# Patient Record
Sex: FEMALE | Race: WHITE | NOT HISPANIC OR LATINO | Employment: FULL TIME | URBAN - METROPOLITAN AREA
[De-identification: names, ages, dates, MRNs, and addresses within clinical notes are randomized per-mention and may not be internally consistent; named-entity substitution may affect disease eponyms.]

---

## 2017-01-16 ENCOUNTER — OFFICE VISIT (OUTPATIENT)
Dept: PAIN MEDICINE | Facility: CLINIC | Age: 57
End: 2017-01-16
Payer: COMMERCIAL

## 2017-01-16 ENCOUNTER — LAB VISIT (OUTPATIENT)
Dept: LAB | Facility: OTHER | Age: 57
End: 2017-01-16
Attending: NURSE PRACTITIONER
Payer: COMMERCIAL

## 2017-01-16 VITALS
RESPIRATION RATE: 20 BRPM | SYSTOLIC BLOOD PRESSURE: 135 MMHG | DIASTOLIC BLOOD PRESSURE: 82 MMHG | HEART RATE: 72 BPM | HEIGHT: 63 IN | TEMPERATURE: 98 F | WEIGHT: 274.06 LBS | BODY MASS INDEX: 48.56 KG/M2

## 2017-01-16 DIAGNOSIS — M79.10 MYALGIA: ICD-10-CM

## 2017-01-16 DIAGNOSIS — M47.816 FACET ARTHROPATHY, LUMBAR: ICD-10-CM

## 2017-01-16 DIAGNOSIS — G89.4 CHRONIC PAIN DISORDER: ICD-10-CM

## 2017-01-16 DIAGNOSIS — G57.00 PIRIFORMIS SYNDROME, UNSPECIFIED LATERALITY: ICD-10-CM

## 2017-01-16 DIAGNOSIS — M47.816 FACET ARTHROPATHY, LUMBAR: Primary | ICD-10-CM

## 2017-01-16 DIAGNOSIS — M79.7 FIBROMYALGIA: ICD-10-CM

## 2017-01-16 DIAGNOSIS — M96.1 POSTLAMINECTOMY SYNDROME OF LUMBAR REGION: ICD-10-CM

## 2017-01-16 LAB
CREAT SERPL-MCNC: 0.8 MG/DL
EST. GFR  (AFRICAN AMERICAN): >60 ML/MIN/1.73 M^2
EST. GFR  (NON AFRICAN AMERICAN): >60 ML/MIN/1.73 M^2

## 2017-01-16 PROCEDURE — 99214 OFFICE O/P EST MOD 30 MIN: CPT | Mod: S$GLB,,, | Performed by: NURSE PRACTITIONER

## 2017-01-16 PROCEDURE — 82565 ASSAY OF CREATININE: CPT

## 2017-01-16 PROCEDURE — 36415 COLL VENOUS BLD VENIPUNCTURE: CPT

## 2017-01-16 PROCEDURE — 3075F SYST BP GE 130 - 139MM HG: CPT | Mod: S$GLB,,, | Performed by: NURSE PRACTITIONER

## 2017-01-16 PROCEDURE — 3079F DIAST BP 80-89 MM HG: CPT | Mod: S$GLB,,, | Performed by: NURSE PRACTITIONER

## 2017-01-16 PROCEDURE — 99999 PR PBB SHADOW E&M-EST. PATIENT-LVL IV: CPT | Mod: PBBFAC,,, | Performed by: NURSE PRACTITIONER

## 2017-01-16 PROCEDURE — 1159F MED LIST DOCD IN RCRD: CPT | Mod: S$GLB,,, | Performed by: NURSE PRACTITIONER

## 2017-01-16 RX ORDER — LIDOCAINE AND PRILOCAINE 25; 25 MG/G; MG/G
CREAM TOPICAL
COMMUNITY
Start: 2016-11-08

## 2017-01-16 RX ORDER — LIOTHYRONINE SODIUM 5 UG/1
TABLET ORAL
COMMUNITY
Start: 2016-12-23

## 2017-01-16 RX ORDER — DICLOFENAC SODIUM 16.05 MG/ML
SOLUTION TOPICAL
COMMUNITY
Start: 2016-11-08 | End: 2017-11-21
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
## 2017-01-16 NOTE — MR AVS SNAPSHOT
[facility]  [address]  [place] [address]  Phone: [phone]  Fax: [phone]                  Kaylen Clement   2017 2:20 PM   Office Visit    Description:  Female : 1960   Provider:  CELESTE Cheng   Department:  [facility]           Reason for Visit     Leg Pain     Back Pain           Diagnoses this Visit        Comments    Facet arthropathy, lumbar    -  Primary     Postlaminectomy syndrome of lumbar region         Myalgia         Piriformis syndrome, unspecified laterality         Chronic pain disorder         Fibromyalgia                To Do List           Future Appointments        Provider Department Dept Phone    3/13/2017 3:15 PM Thalia Vargas MD [facility] [phone]      Goals (5 Years of Data)     None      [facility]     [facility] Care [facility] -  Assistance  Registered nurses in the [facility] provide clinical advisement, health education, appointment booking, and other advisory services.  Call for this free service at [phone].             Medications           STOP taking these medications     levothyroxine (SYNTHROID) 100 MCG tablet            Verify that the below list of medications is an accurate representation of the medications you are currently taking.  If none reported, the list may be blank. If incorrect, please contact your healthcare provider. Carry this list with you in case of emergency.           Current Medications     albuterol (ACCUNEB) 1.25 mg/3 mL Nebu 1 ampule.    amitriptyline (ELAVIL) 10 MG tablet Take 10 mg by mouth every evening.    aspirin (ECOTRIN) 81 MG EC tablet Take 1 tablet (81 mg total) by mouth once daily.    azithromycin (Z-GUILLERMO) 250 MG tablet     butalbital-acetaminophen-caffeine -40 mg (FIORICET) -40 mg per tablet Take 1 tablet by mouth every 4 (four) hours as needed for Pain.    cetirizine (ZYRTEC) 10 MG tablet Take 10  "mg by mouth once daily.    cyclobenzaprine (FLEXERIL) 10 MG tablet Take 10 mg by mouth 3 (three) times daily as needed for Muscle spasms.    dextroamphetamine-amphetamine (ADDERALL XR) 10 MG 24 hr capsule     diclofenac sodium 1.5 % Drop     esomeprazole (NEXIUM) 40 MG capsule Take 40 mg by mouth before breakfast.    estradiol (ESTRACE) 2 MG tablet Take 2 mg by mouth once daily.    gabapentin (NEURONTIN) 600 MG tablet Take 600 mg by mouth once daily.    GRALISE 600 mg Tb24 Take 1 tablet by mouth every morning.    lidocaine-prilocaine (EMLA) cream     lorazepam (ATIVAN) 2 MG Tab Take 4 mg by mouth every evening.     losartan (COZAAR) 100 MG tablet Take 0.5 tablets by mouth once daily.    me-thfolate gluc-B12-herb #236 1-1-500 mg Cap Take 1 capsule by mouth once daily.    meloxicam (MOBIC) 7.5 MG tablet Take 7.5 mg by mouth daily as needed for Pain.    meperidine (DEMEROL) 50 mg tablet Take 50 mg by mouth every 4 (four) hours as needed for Pain.    metoprolol tartrate (LOPRESSOR) 100 MG tablet Take 0.5 tablets by mouth once daily.    oxycodone-acetaminophen (PERCOCET)  mg per tablet Take 1 tablet by mouth every 4 (four) hours as needed for Pain.    promethazine (PHENERGAN) 50 MG tablet Take 50 mg by mouth every 8 (eight) hours as needed for Nausea.    zolpidem (AMBIEN CR) 6.25 MG CR tablet Take 6.25 mg by mouth nightly as needed.    liothyronine (CYTOMEL) 5 MCG Tab            Clinical Reference Information           Vital Signs - Last Recorded  Most recent update: 1/16/2017  2:29 PM by Kaya Somers MA    BP Pulse Temp Resp Ht Wt    135/82 72 97.5 °F (36.4 °C) (Oral) 20 5' 3" (1.6 m) 124.3 kg (274 lb 0.5 oz)    BMI                48.54 kg/m2          Blood Pressure          Most Recent Value    BP  135/82      Allergies as of 1/16/2017     Zetia [Ezetimibe]    Sulfa (Sulfonamide Antibiotics)      Immunizations Administered on Date of Encounter - 1/16/2017     None      Orders Placed During Today's Visit      " Normal Orders This Visit    Ambulatory consult to Physical Therapy     MRI Lumbar Spine W WO Contrast     Future Labs/Procedures Expected by Expires    CREATININE, SERUM  1/16/2017 3/17/2018    MRI Lumbar Spine W WO Contrast  1/16/2017 1/16/2018      [facility] Sign-Up     Activating your [facility] account is as easy as 1-2-3!     1) Visit [email or web address], select Sign Up Now, enter this activation code and your date of birth, then select Next.  [email or web address]  Expires: 3/2/2017  3:13 PM      2) Create a username and password to use when you visit [facility] in the future and select a security question in case you lose your password and select Next.    3) Enter your e-mail address and click Sign Up!    Additional Information  If you have questions, please e-mail [email or web address] or call [phone] to talk to our [facility] staff. Remember, [facility] is NOT to be used for urgent needs. For medical emergencies, dial 911.

## 2017-02-17 ENCOUNTER — TELEPHONE (OUTPATIENT)
Dept: PAIN MEDICINE | Facility: CLINIC | Age: 57
End: 2017-02-17

## 2017-02-17 NOTE — TELEPHONE ENCOUNTER
----- Message from Briseyda Gamino sent at 2/17/2017 10:37 AM CST -----  _  1st Request  _  2nd Request  _  3rd Request        Who: [facility]    Why: they are calling to get authorization for the MRI that was ordered    What Number to Call Back: [phone] ask for Elisa     When to Expect a call back: (Before the end of the day)   -- if the call is after 12:00, the call back will be tomorrow.        [employer] was contacted PA was denied  completed over the phone peer to peer that was also denied

## 2017-03-06 ENCOUNTER — TELEPHONE (OUTPATIENT)
Dept: PAIN MEDICINE | Facility: CLINIC | Age: 57
End: 2017-03-06

## 2017-03-06 NOTE — TELEPHONE ENCOUNTER
Left voice message requesting a return call to reschedule appt on 3-13-17 due to Dr. gonzalez being in surgery that afternoon.

## 2017-03-07 NOTE — TELEPHONE ENCOUNTER
Left voice message asking for a return call to reschedule her appt on 3-13-17 at 3:15pm with Dr. Vargas.

## 2017-11-21 ENCOUNTER — OFFICE VISIT (OUTPATIENT)
Dept: PAIN MEDICINE | Facility: CLINIC | Age: 57
End: 2017-11-21
Payer: COMMERCIAL

## 2017-11-21 VITALS
DIASTOLIC BLOOD PRESSURE: 87 MMHG | TEMPERATURE: 97 F | WEIGHT: 271.19 LBS | HEIGHT: 63 IN | HEART RATE: 49 BPM | SYSTOLIC BLOOD PRESSURE: 143 MMHG | BODY MASS INDEX: 48.05 KG/M2 | RESPIRATION RATE: 20 BRPM

## 2017-11-21 DIAGNOSIS — G89.4 CHRONIC PAIN DISORDER: Primary | ICD-10-CM

## 2017-11-21 DIAGNOSIS — M54.17 LUMBOSACRAL RADICULOPATHY: ICD-10-CM

## 2017-11-21 DIAGNOSIS — M96.1 POSTLAMINECTOMY SYNDROME OF LUMBAR REGION: ICD-10-CM

## 2017-11-21 DIAGNOSIS — M47.816 FACET ARTHROPATHY, LUMBAR: ICD-10-CM

## 2017-11-21 PROCEDURE — 99999 PR PBB SHADOW E&M-EST. PATIENT-LVL III: CPT | Mod: PBBFAC,,, | Performed by: NURSE PRACTITIONER

## 2017-11-21 PROCEDURE — 99213 OFFICE O/P EST LOW 20 MIN: CPT | Mod: S$GLB,,, | Performed by: NURSE PRACTITIONER
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
## 2017-11-25 ENCOUNTER — HOSPITAL ENCOUNTER (OUTPATIENT)
Dept: RADIOLOGY | Facility: HOSPITAL | Age: 57
Discharge: HOME | End: 2017-11-25
Attending: NURSE PRACTITIONER
Payer: COMMERCIAL

## 2017-11-25 DIAGNOSIS — M96.1 POSTLAMINECTOMY SYNDROME OF LUMBAR REGION: ICD-10-CM

## 2017-11-25 DIAGNOSIS — M79.7 FIBROMYALGIA: ICD-10-CM

## 2017-11-25 DIAGNOSIS — G57.00 PIRIFORMIS SYNDROME, UNSPECIFIED LATERALITY: ICD-10-CM

## 2017-11-25 DIAGNOSIS — G89.4 CHRONIC PAIN DISORDER: ICD-10-CM

## 2017-11-25 DIAGNOSIS — M47.816 FACET ARTHROPATHY, LUMBAR: ICD-10-CM

## 2017-11-25 DIAGNOSIS — M79.10 MYALGIA: ICD-10-CM

## 2017-11-25 PROCEDURE — 72148 MRI LUMBAR SPINE W/O DYE: CPT | Mod: TC

## 2017-11-25 PROCEDURE — 72148 MRI LUMBAR SPINE W/O DYE: CPT | Mod: 26,,, | Performed by: RADIOLOGY

## 2017-11-28 ENCOUNTER — OFFICE VISIT (OUTPATIENT)
Dept: PAIN MEDICINE | Facility: CLINIC | Age: 57
End: 2017-11-28
Payer: COMMERCIAL

## 2017-11-28 VITALS
HEIGHT: 63 IN | TEMPERATURE: 97 F | SYSTOLIC BLOOD PRESSURE: 155 MMHG | WEIGHT: 275.63 LBS | DIASTOLIC BLOOD PRESSURE: 89 MMHG | HEART RATE: 63 BPM | BODY MASS INDEX: 48.84 KG/M2 | RESPIRATION RATE: 20 BRPM

## 2017-11-28 DIAGNOSIS — M54.17 LUMBOSACRAL RADICULOPATHY: ICD-10-CM

## 2017-11-28 DIAGNOSIS — M96.1 POSTLAMINECTOMY SYNDROME OF LUMBAR REGION: ICD-10-CM

## 2017-11-28 DIAGNOSIS — M47.816 FACET ARTHROPATHY, LUMBAR: ICD-10-CM

## 2017-11-28 DIAGNOSIS — M79.10 MYALGIA: ICD-10-CM

## 2017-11-28 DIAGNOSIS — G57.00 PIRIFORMIS SYNDROME, UNSPECIFIED LATERALITY: ICD-10-CM

## 2017-11-28 DIAGNOSIS — G89.4 CHRONIC PAIN DISORDER: Primary | ICD-10-CM

## 2017-11-28 PROCEDURE — 99213 OFFICE O/P EST LOW 20 MIN: CPT | Mod: S$GLB,,, | Performed by: NURSE PRACTITIONER

## 2017-11-28 PROCEDURE — 99999 PR PBB SHADOW E&M-EST. PATIENT-LVL IV: CPT | Mod: PBBFAC,,, | Performed by: NURSE PRACTITIONER
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
## 2018-08-29 ENCOUNTER — TELEPHONE (OUTPATIENT)
Dept: PAIN MEDICINE | Facility: CLINIC | Age: 58
End: 2018-08-29

## 2018-08-29 NOTE — TELEPHONE ENCOUNTER
Hello, this is staff I've received your request I'm returning your call from the pain management clinic at [facility]. I just wanted to let you know that I'm working on getting an answer for you by the time you call the clinic back.    Pt was was informed via voice mail to contact clinic, and schedule appointment with nurse practitioner to discuss injection.

## 2018-08-29 NOTE — TELEPHONE ENCOUNTER
----- Message from Eyad Trevino sent at 8/29/2018  9:00 AM CDT -----  Contact: Carlos, patient's             Name of Who is Calling: Carlos, patient's       What is the request in detail: Patient's  says his wife is still having back pain and asking should patient get an injection      Can the clinic reply by [facility]: No      What Number to Call Back if not in [facility]: [phone]

## 2018-11-09 ENCOUNTER — OUTSIDE PLACE OF SERVICE (OUTPATIENT)
Dept: CARDIOLOGY | Facility: CLINIC | Age: 58
End: 2018-11-09
Payer: COMMERCIAL

## 2018-11-09 PROCEDURE — 93010 ELECTROCARDIOGRAM REPORT: CPT | Mod: S$GLB,,, | Performed by: INTERNAL MEDICINE

## 2019-08-04 ENCOUNTER — HOSPITAL ENCOUNTER (EMERGENCY)
Facility: HOSPITAL | Age: 59
Discharge: HOME | End: 2019-08-04
Attending: EMERGENCY MEDICINE
Payer: COMMERCIAL

## 2019-08-04 VITALS
BODY MASS INDEX: 46.95 KG/M2 | HEIGHT: 63 IN | TEMPERATURE: 98 F | OXYGEN SATURATION: 100 % | RESPIRATION RATE: 16 BRPM | WEIGHT: 265 LBS | DIASTOLIC BLOOD PRESSURE: 76 MMHG | HEART RATE: 98 BPM | SYSTOLIC BLOOD PRESSURE: 138 MMHG
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
## 2019-08-04 DIAGNOSIS — M25.562 LEFT KNEE PAIN, UNSPECIFIED CHRONICITY: Primary | ICD-10-CM

## 2019-08-04 DIAGNOSIS — E66.01 MORBID OBESITY: ICD-10-CM

## 2019-08-04 PROCEDURE — 99283 EMERGENCY DEPT VISIT LOW MDM: CPT | Mod: 25

## 2019-08-04 PROCEDURE — 99284 EMERGENCY DEPT VISIT MOD MDM: CPT | Mod: ,,, | Performed by: EMERGENCY MEDICINE

## 2019-08-04 PROCEDURE — 29505 APPLICATION LONG LEG SPLINT: CPT | Mod: LT

## 2019-08-04 PROCEDURE — 99284 PR EMERGENCY DEPT VISIT,LEVEL IV: ICD-10-PCS | Mod: ,,, | Performed by: EMERGENCY MEDICINE

## 2019-08-04 PROCEDURE — 25000003 PHARM REV CODE 250: Performed by: EMERGENCY MEDICINE

## 2019-08-04 RX ORDER — ONDANSETRON 4 MG/1
4 TABLET, ORALLY DISINTEGRATING ORAL
Status: COMPLETED | OUTPATIENT
Start: 2019-08-04 | End: 2019-08-04

## 2019-08-04 RX ORDER — OMEPRAZOLE 10 MG/1
10 CAPSULE, DELAYED RELEASE ORAL DAILY
COMMUNITY

## 2019-08-04 RX ORDER — MAGNESIUM 30 MG
TABLET ORAL ONCE
COMMUNITY

## 2019-08-04 RX ORDER — PNV NO.95/FERROUS FUM/FOLIC AC 28MG-0.8MG
100 TABLET ORAL DAILY
COMMUNITY

## 2019-08-04 RX ORDER — ZINC GLUCONATE 50 MG
50 TABLET ORAL DAILY
COMMUNITY

## 2019-08-04 RX ORDER — AMOXICILLIN 500 MG
CAPSULE ORAL DAILY
COMMUNITY

## 2019-08-04 RX ORDER — OXYCODONE HYDROCHLORIDE 5 MG/1
10 TABLET ORAL
Status: COMPLETED | OUTPATIENT
Start: 2019-08-04 | End: 2019-08-04

## 2019-08-04 RX ORDER — CHOLECALCIFEROL (VITAMIN D3) 25 MCG
1000 TABLET ORAL WEEKLY
COMMUNITY

## 2019-08-04 RX ADMIN — OXYCODONE HYDROCHLORIDE 10 MG: 5 TABLET ORAL at 05:08

## 2019-08-04 RX ADMIN — ONDANSETRON 4 MG: 4 TABLET, ORALLY DISINTEGRATING ORAL at 05:08

## 2019-08-04 NOTE — DISCHARGE INSTRUCTIONS
Do not bear weight on left lower extremity until cleared by PCP or orthopedics. Use crutches. Alternate motrin and tylenol as directed over the counter for pain control.

## 2019-08-04 NOTE — ED PROVIDER NOTES
Encounter Date: 8/4/2019    SCRIBE #1 NOTE: I, Mariusz Mcdaniel, am scribing for, and in the presence of,  Dr. Pinto. I have scribed the entire note.       History     Chief Complaint   Patient presents with    Fall     pt c/o left knee pain and mid back pain after trip and fall on a rug. Denies hitting head.      Time patient was seen by the provider: 4:18 PM      Ms. Kaylen Clement is a 58 y.o. female with history of: HTN and obesity, who presents to the ED with a complaint of left knee pain and back pain after tripping on a rug and falling. She feels that she twisted when she fell, and landed on her left kneecap. She took a narcotic pain medication earlier today a few hours before the fall, but has not taken any since her fall. Pending her knee worsens pain. Reports she is supposed to use a cane, but was not using it at the time. Denies head injury, neck pain, chest pain, SOB, abd pain, numbness, tingling, weakness.    The history is provided by the patient.     Review of patient's allergies indicates:   Allergen Reactions    Shellfish containing products Other (See Comments)     Physician states she is highly allergic from a blood test done as per patient.     Zetia [ezetimibe] Anaphylaxis     Leg cramps    Milk containing products Nausea Only    Sulfa (sulfonamide antibiotics) Rash     Past Medical History:   Diagnosis Date    Anxiety     Arthritis     Fibromyalgia     Hypertension     Migraines     Obesity      Past Surgical History:   Procedure Laterality Date    BLADDER SUSPENSION      HERNIA REPAIR      LUMBAR FUSION      SINUS SURGERY      TOTAL ABDOMINAL HYSTERECTOMY W/ BILATERAL SALPINGOOPHORECTOMY Bilateral 1994     Family History   Problem Relation Age of Onset    Coronary artery disease Mother     Diabetes Mother     Emphysema Mother     Heart failure Mother     Rheum arthritis Mother     Lung cancer Father     Rheum arthritis Sister     Thyroid disease Sister      Social  History     Tobacco Use    Smoking status: Never Smoker   Substance Use Topics    Alcohol use: No    Drug use: No     Review of Systems   Constitutional: Negative for fever.   HENT: Negative for sore throat.    Eyes: Negative for visual disturbance.   Respiratory: Negative for shortness of breath.    Cardiovascular: Negative for chest pain.   Gastrointestinal: Negative for nausea.   Genitourinary: Negative for dysuria.   Musculoskeletal: Positive for back pain.        Positive for left knee pain.    Skin: Negative for rash.   Neurological: Negative for weakness.       Physical Exam     Initial Vitals [08/04/19 1531]   BP Pulse Resp Temp SpO2   138/76 98 16 98 °F (36.7 °C) 100 %      MAP       --         Physical Exam    Nursing note and vitals reviewed.  Constitutional: She appears well-developed and well-nourished. She is not diaphoretic. No distress.   HENT:   Head: Normocephalic and atraumatic.   Right Ear: External ear normal.   Left Ear: External ear normal.   Neck: Neck supple.   Cardiovascular: Normal rate, regular rhythm, normal heart sounds and intact distal pulses.   Pulmonary/Chest: Breath sounds normal. No respiratory distress. She has no wheezes. She has no rhonchi. She has no rales.   Abdominal: Soft. She exhibits no distension. There is no tenderness.   Musculoskeletal: She exhibits no edema.   Questionable effusion to left knee with tenderness to palpation over lower medial knee. Normal patellar mobility. Bilateral normal range of motion. No midline C, T, or L spine TTP.   Neurological: She is alert and oriented to person, place, and time. No sensory deficit. GCS score is 15. GCS eye subscore is 4. GCS verbal subscore is 5. GCS motor subscore is 6.   Skin: Skin is warm. Capillary refill takes less than 2 seconds. No rash noted.   Psychiatric: She has a normal mood and affect.         ED Course   Procedures  Labs Reviewed - No data to display       Imaging Results          X-Ray Knee 1 or 2 View  Left (Final result)  Result time 08/04/19 16:53:15   Procedure changed from X-Ray Knee 3 View Left     Final result by Jordy Hardin MD (08/04/19 16:53:15)                 Impression:      No acute displaced fracture-dislocation identified.      Electronically signed by: Jordy Hardin MD  Date:    08/04/2019  Time:    16:53             Narrative:    EXAMINATION:  XR KNEE 1 OR 2 VIEW LEFT    CLINICAL HISTORY:  fall left knee pain unspecified;  Unspecified fall, initial encounter    TECHNIQUE:  AP and lateral views left knee    COMPARISON:  None    FINDINGS:  No displaced fracture, dislocation or destructive osseous process.  Mild-to-moderate tricompartmental degenerative change greatest at the medial and patellofemoral compartments.  No large suprapatellar joint effusion seen.  No subcutaneous emphysema or radiodense retained foreign body.                              X-Rays:   Independently Interpreted Readings:   Other Readings:  Left knee: no acute fx or dislocation on my read. No effusion.    Medical Decision Making:   History:   Old Medical Records: I decided to obtain old medical records.  Old Records Summarized: records from clinic visits.  Independently Interpreted Test(s):   I have ordered and independently interpreted X-rays - see prior notes.  Clinical Tests:   Radiological Study: Ordered and Reviewed  ED Management:  Vitals normal, afebrile. Obese female s/p fall, with left knee pain. Low suspicion for acute fracture. Able to bear weight. Neurovascularly intact. Will check x-ray of left knee. Denies need for pain medication at this time.     XR negative. Unlikely to be occult tibial plateau fx with mechanism and ability to bear weight.  Requesting pain medications. Home oxycodone provided;  reviewed; intermittently has rx for percocet 10. Placed in knee immobilizer and put on crutches. Advised to NWB LLE until f/u w/ PCP or ortho. Referral placed. Advised PRN motrin/tylenol for pain at home.      Stable for discharge at this time. Return precautions discussed.             Scribe Attestation:   Scribe #1: I performed the above scribed service and the documentation accurately describes the services I performed. I attest to the accuracy of the note.               Clinical Impression:       ICD-10-CM ICD-9-CM   1. Left knee pain, unspecified chronicity M25.562 719.46   2. Fall W19.XXXA E888.9   3. Morbid obesity E66.01 278.01         Disposition:   Disposition: Discharged  Condition: Stable                        Ean Pinto MD  08/06/19 1470

## 2019-08-04 NOTE — ED NOTES
"The patient came to the ER today after a fall at a furniture store today about one hour ago. Pt fell over a stack of rugs in a walkway. Her left shoe caught the rug and when she went down "she hit her left knee and heard a popping'. Reports left knee is throbbing and c/o back pain from "twisting it". Hx of osteoporosis in her upper back and was just told by her doctor to "try not to fall". Reports hx of lumbar fusion. Pt arrived to ER by EMS. Patient normally uses a cane to ambulate, but admits she did not have her cane with her at the furniture store. Patient reports her last fall was in February. Pt states she is unsure if she hit her head, no loc. Patient states she takes a baby 81mg asa about once a week. Pt admits to taking a Percocet (which is prescribed to her) around 12 noon  "

## 2021-04-20 ENCOUNTER — OUTSIDE PLACE OF SERVICE (OUTPATIENT)
Dept: CARDIOLOGY | Facility: CLINIC | Age: 61
End: 2021-04-20
Payer: COMMERCIAL

## 2021-04-20 PROCEDURE — 93010 PR ELECTROCARDIOGRAM REPORT: ICD-10-PCS | Mod: ,,, | Performed by: INTERNAL MEDICINE

## 2021-04-20 PROCEDURE — 93010 ELECTROCARDIOGRAM REPORT: CPT | Mod: ,,, | Performed by: INTERNAL MEDICINE

## 2023-05-10 ENCOUNTER — OFFICE VISIT (OUTPATIENT)
Dept: ENDOCRINOLOGY | Facility: CLINIC | Age: 63
End: 2023-05-10
Payer: MEDICARE

## 2023-05-10 ENCOUNTER — LAB VISIT (OUTPATIENT)
Dept: LAB | Facility: HOSPITAL | Age: 63
End: 2023-05-10
Attending: INTERNAL MEDICINE
Payer: MEDICARE

## 2023-05-10 VITALS
BODY MASS INDEX: 49.98 KG/M2 | HEART RATE: 86 BPM | HEIGHT: 63 IN | WEIGHT: 282.06 LBS | DIASTOLIC BLOOD PRESSURE: 80 MMHG | SYSTOLIC BLOOD PRESSURE: 138 MMHG | OXYGEN SATURATION: 95 %

## 2023-05-10 DIAGNOSIS — E66.01 MORBID OBESITY WITH BMI OF 45.0-49.9, ADULT: ICD-10-CM

## 2023-05-10 DIAGNOSIS — E66.01 MORBID OBESITY WITH BMI OF 45.0-49.9, ADULT: Primary | ICD-10-CM

## 2023-05-10 DIAGNOSIS — Z83.3 FAMILY HISTORY OF TYPE 2 DIABETES MELLITUS IN MOTHER: ICD-10-CM

## 2023-05-10 DIAGNOSIS — R53.82 CHRONIC FATIGUE: ICD-10-CM

## 2023-05-10 DIAGNOSIS — R73.09 OTHER ABNORMAL GLUCOSE: ICD-10-CM

## 2023-05-10 LAB
25(OH)D3+25(OH)D2 SERPL-MCNC: 28 NG/ML (ref 30–96)
ALBUMIN SERPL BCP-MCNC: 3.2 G/DL (ref 3.5–5.2)
ALP SERPL-CCNC: 82 U/L (ref 55–135)
ALT SERPL W/O P-5'-P-CCNC: 10 U/L (ref 10–44)
ANION GAP SERPL CALC-SCNC: 9 MMOL/L (ref 8–16)
AST SERPL-CCNC: 13 U/L (ref 10–40)
BILIRUB SERPL-MCNC: 0.4 MG/DL (ref 0.1–1)
BUN SERPL-MCNC: 13 MG/DL (ref 6–20)
CALCIUM SERPL-MCNC: 9 MG/DL (ref 8.7–10.5)
CHLORIDE SERPL-SCNC: 98 MMOL/L (ref 95–110)
CO2 SERPL-SCNC: 26 MMOL/L (ref 23–29)
CREAT SERPL-MCNC: 0.9 MG/DL (ref 0.5–1.4)
EST. GFR  (NO RACE VARIABLE): >60 ML/MIN/1.73 M^2
ESTIMATED AVG GLUCOSE: 105 MG/DL (ref 68–131)
GLUCOSE SERPL-MCNC: 109 MG/DL (ref 70–110)
HBA1C MFR BLD: 5.3 % (ref 4–5.6)
POTASSIUM SERPL-SCNC: 3.8 MMOL/L (ref 3.5–5.1)
PROT SERPL-MCNC: 7 G/DL (ref 6–8.4)
SODIUM SERPL-SCNC: 133 MMOL/L (ref 136–145)

## 2023-05-10 PROCEDURE — 3008F BODY MASS INDEX DOCD: CPT | Mod: CPTII,S$GLB,, | Performed by: INTERNAL MEDICINE

## 2023-05-10 PROCEDURE — 83036 HEMOGLOBIN GLYCOSYLATED A1C: CPT | Performed by: INTERNAL MEDICINE

## 2023-05-10 PROCEDURE — 4010F PR ACE/ARB THEARPY RXD/TAKEN: ICD-10-PCS | Mod: CPTII,S$GLB,, | Performed by: INTERNAL MEDICINE

## 2023-05-10 PROCEDURE — 36415 COLL VENOUS BLD VENIPUNCTURE: CPT | Performed by: INTERNAL MEDICINE

## 2023-05-10 PROCEDURE — 1159F MED LIST DOCD IN RCRD: CPT | Mod: CPTII,S$GLB,, | Performed by: INTERNAL MEDICINE

## 2023-05-10 PROCEDURE — 3075F SYST BP GE 130 - 139MM HG: CPT | Mod: CPTII,S$GLB,, | Performed by: INTERNAL MEDICINE

## 2023-05-10 PROCEDURE — 99999 PR PBB SHADOW E&M-EST. PATIENT-LVL V: ICD-10-PCS | Mod: PBBFAC,,, | Performed by: INTERNAL MEDICINE

## 2023-05-10 PROCEDURE — 1159F PR MEDICATION LIST DOCUMENTED IN MEDICAL RECORD: ICD-10-PCS | Mod: CPTII,S$GLB,, | Performed by: INTERNAL MEDICINE

## 2023-05-10 PROCEDURE — 99204 PR OFFICE/OUTPT VISIT, NEW, LEVL IV, 45-59 MIN: ICD-10-PCS | Mod: S$GLB,,, | Performed by: INTERNAL MEDICINE

## 2023-05-10 PROCEDURE — 80053 COMPREHEN METABOLIC PANEL: CPT | Performed by: INTERNAL MEDICINE

## 2023-05-10 PROCEDURE — 3079F PR MOST RECENT DIASTOLIC BLOOD PRESSURE 80-89 MM HG: ICD-10-PCS | Mod: CPTII,S$GLB,, | Performed by: INTERNAL MEDICINE

## 2023-05-10 PROCEDURE — 99204 OFFICE O/P NEW MOD 45 MIN: CPT | Mod: S$GLB,,, | Performed by: INTERNAL MEDICINE

## 2023-05-10 PROCEDURE — 3075F PR MOST RECENT SYSTOLIC BLOOD PRESS GE 130-139MM HG: ICD-10-PCS | Mod: CPTII,S$GLB,, | Performed by: INTERNAL MEDICINE

## 2023-05-10 PROCEDURE — 82306 VITAMIN D 25 HYDROXY: CPT | Performed by: INTERNAL MEDICINE

## 2023-05-10 PROCEDURE — 3008F PR BODY MASS INDEX (BMI) DOCUMENTED: ICD-10-PCS | Mod: CPTII,S$GLB,, | Performed by: INTERNAL MEDICINE

## 2023-05-10 PROCEDURE — 3079F DIAST BP 80-89 MM HG: CPT | Mod: CPTII,S$GLB,, | Performed by: INTERNAL MEDICINE

## 2023-05-10 PROCEDURE — 4010F ACE/ARB THERAPY RXD/TAKEN: CPT | Mod: CPTII,S$GLB,, | Performed by: INTERNAL MEDICINE

## 2023-05-10 PROCEDURE — 99999 PR PBB SHADOW E&M-EST. PATIENT-LVL V: CPT | Mod: PBBFAC,,, | Performed by: INTERNAL MEDICINE

## 2023-05-10 RX ORDER — PANTOPRAZOLE SODIUM 40 MG/1
40 TABLET, DELAYED RELEASE ORAL
COMMUNITY
Start: 2023-05-01

## 2023-05-10 RX ORDER — BUDESONIDE AND FORMOTEROL FUMARATE DIHYDRATE 160; 4.5 UG/1; UG/1
AEROSOL RESPIRATORY (INHALATION)
COMMUNITY

## 2023-05-10 RX ORDER — ONDANSETRON 8 MG/1
TABLET, ORALLY DISINTEGRATING ORAL
COMMUNITY
Start: 2023-01-05

## 2023-05-10 RX ORDER — ICOSAPENT ETHYL 500 MG/1
CAPSULE ORAL
COMMUNITY
Start: 2023-01-31

## 2023-05-10 RX ORDER — TRAMADOL HYDROCHLORIDE 50 MG/1
TABLET ORAL
COMMUNITY

## 2023-05-10 RX ORDER — MONTELUKAST SODIUM 10 MG/1
10 TABLET ORAL
COMMUNITY
Start: 2023-05-03

## 2023-05-10 RX ORDER — TIZANIDINE 4 MG/1
4 TABLET ORAL NIGHTLY
COMMUNITY
Start: 2023-01-05

## 2023-05-10 RX ORDER — VERAPAMIL HYDROCHLORIDE 240 MG/1
240 TABLET, FILM COATED, EXTENDED RELEASE ORAL
COMMUNITY
Start: 2023-02-07

## 2023-05-10 NOTE — PROGRESS NOTES
Subjective:      Patient ID: Kaylen Clement is a 62 y.o. female.    Chief Complaint:  Consult      History of Present Illness    Concerned she may have diabetes.   Reports fatigue, Fibromyalgia, sleep apnea - needs updated machine.    Has checked her POCT at home with readings of 147   Reports previous gynecologist diagnosed her with borderline diabetes based on abnormal blood sugars.   Reports dry mouth associated with medication use.   Reports polyuria due to drinking much water.   Denies unexplained weight loss   Denies blurred vision.   Denies frequent urinary tract infections (typically may have 1 per year)  Steroids: denies   Denies hospitalization for high blood sugars.     Reports difficulty losing weight   Sodas: 1 a day and occasionally no sodas  Does not exercise regularly.  Does not take vitamins regularly     Mother () had a touch of diabetes.       Has primary care whom she sees regularly.     Review of Systems  Denies recent illness   Diarrhea from blackberries     Objective:   Physical Exam  Constitutional:       General: She is not in acute distress.     Appearance: Normal appearance. She is well-developed.   Eyes:      General: No scleral icterus.     Extraocular Movements: Extraocular movements intact.      Conjunctiva/sclera: Conjunctivae normal.      Pupils: Pupils are equal, round, and reactive to light.      Comments: No proptosis.    Neck:      Thyroid: No thyromegaly.      Trachea: No tracheal deviation.   Cardiovascular:      Rate and Rhythm: Normal rate.   Pulmonary:      Effort: Pulmonary effort is normal.      Breath sounds: Normal breath sounds.   Musculoskeletal:      Cervical back: Normal range of motion and neck supple.   Lymphadenopathy:      Cervical: No cervical adenopathy.   Skin:     General: Skin is warm and dry.      Findings: No rash.      Comments: + skin tags   + acanthosis   No striae no bruising   Neurological:      Mental Status: She is alert.      Deep Tendon  "Reflexes: Reflexes are normal and symmetric.      Comments: No tremor.     Vitals:    05/10/23 1018   BP: 138/80   BP Location: Left arm   Patient Position: Sitting   BP Method: Large (Manual)   Pulse: 86   SpO2: 95%   Weight: 128 kg (282 lb 1.3 oz)   Height: 5' 3" (1.6 m)       BP Readings from Last 3 Encounters:   05/10/23 138/80   08/04/19 138/76   11/28/17 (!) 155/89     Wt Readings from Last 1 Encounters:   05/10/23 1018 128 kg (282 lb 1.3 oz)         Body mass index is 49.97 kg/m².    Lab Review:   Lab Results   Component Value Date    HGBA1C 5.4 07/11/2014     No results found for: CHOL, HDL, LDLCALC, TRIG, CHOLHDL  Lab Results   Component Value Date    CREATININE 0.8 01/16/2017    ESTGFRAFRICA >60.0 01/16/2017    EGFRNONAA >60.0 01/16/2017    TSH 1.940 07/11/2014         Assessment and Plan     Chronic fatigue  Multifactorial:  Untreated sleep apnea  No history of diabetes, but has family history, sedentary life style and high BMI   Discussed weight loss, instructions provided.     Check fasting CMP and A1C  Check vitamin D levels     Morbid obesity with BMI of 45.0-49.9, adult  See #1          "

## 2023-05-10 NOTE — ASSESSMENT & PLAN NOTE
Multifactorial:  Untreated sleep apnea  No history of diabetes, but has family history, sedentary life style and high BMI   Discussed weight loss, instructions provided.     Check fasting CMP and A1C  Check vitamin D levels

## 2023-05-10 NOTE — PATIENT INSTRUCTIONS
Goal weight loss 5% body weight ( 14 lbs)    Weigh yourself daily    Simple plan: avoid anything made with flour or sugar.     Increase vegetables, lean proteins and limit carbohydrates.     Choose high fiber carbohydrates, that is a carbohydrate that has more than 3 - 5 grams of fiber per serving. Examples of starchy foods that are good for you are beans, squash.      Drink plenty of water and avoid drinks with sugar such as regular sodas.     No snacking     Nutrition plan:  1) lower carbohydrate plans   2) intermittent fasting     Book:   How to lose weight for the last time, Dr. Torie Villalpando     We are going to check labs today to see if you have diabetes.

## 2023-05-15 ENCOUNTER — PATIENT MESSAGE (OUTPATIENT)
Dept: ENDOCRINOLOGY | Facility: CLINIC | Age: 63
End: 2023-05-15
Payer: MEDICARE

## 2023-05-15 NOTE — TELEPHONE ENCOUNTER
Results for orders placed or performed in visit on 05/10/23   Hemoglobin A1C   Result Value Ref Range    Hemoglobin A1C 5.3 4.0 - 5.6 %    Estimated Avg Glucose 105 68 - 131 mg/dL   Comprehensive Metabolic Panel   Result Value Ref Range    Sodium 133 (L) 136 - 145 mmol/L    Potassium 3.8 3.5 - 5.1 mmol/L    Chloride 98 95 - 110 mmol/L    CO2 26 23 - 29 mmol/L    Glucose 109 70 - 110 mg/dL    BUN 13 6 - 20 mg/dL    Creatinine 0.9 0.5 - 1.4 mg/dL    Calcium 9.0 8.7 - 10.5 mg/dL    Total Protein 7.0 6.0 - 8.4 g/dL    Albumin 3.2 (L) 3.5 - 5.2 g/dL    Total Bilirubin 0.4 0.1 - 1.0 mg/dL    Alkaline Phosphatase 82 55 - 135 U/L    AST 13 10 - 40 U/L    ALT 10 10 - 44 U/L    Anion Gap 9 8 - 16 mmol/L    eGFR >60.0 >60 mL/min/1.73 m^2   Vitamin D   Result Value Ref Range    Vit D, 25-Hydroxy 28 (L) 30 - 96 ng/mL

## 2023-05-18 ENCOUNTER — TELEPHONE (OUTPATIENT)
Dept: ENDOCRINOLOGY | Facility: CLINIC | Age: 63
End: 2023-05-18
Payer: MEDICARE

## 2023-05-18 RX ORDER — SEMAGLUTIDE 0.25 MG/.5ML
0.25 INJECTION, SOLUTION SUBCUTANEOUS
Qty: 4 EACH | Refills: 0 | Status: SHIPPED | OUTPATIENT
Start: 2023-05-18

## 2023-05-18 NOTE — PROGRESS NOTES
Family history of pancreatic cancer in first-degree relative: denies   Personal history of pancreatitis: denies   Family history of MTC/MEN/endocrine tumors: denies